# Patient Record
Sex: MALE | Race: WHITE | NOT HISPANIC OR LATINO | URBAN - METROPOLITAN AREA
[De-identification: names, ages, dates, MRNs, and addresses within clinical notes are randomized per-mention and may not be internally consistent; named-entity substitution may affect disease eponyms.]

---

## 2019-10-25 ENCOUNTER — OUTPATIENT (OUTPATIENT)
Dept: OUTPATIENT SERVICES | Facility: HOSPITAL | Age: 59
LOS: 1 days | Discharge: HOME | End: 2019-10-25

## 2019-10-25 LAB
GLUCOSE BLDC GLUCOMTR-MCNC: 144 MG/DL — HIGH (ref 70–99)
GLUCOSE BLDC GLUCOMTR-MCNC: 87 MG/DL — SIGNIFICANT CHANGE UP (ref 70–99)

## 2020-01-22 ENCOUNTER — FORM ENCOUNTER (OUTPATIENT)
Age: 60
End: 2020-01-22

## 2020-10-02 ENCOUNTER — FORM ENCOUNTER (OUTPATIENT)
Age: 60
End: 2020-10-02

## 2020-10-28 ENCOUNTER — FORM ENCOUNTER (OUTPATIENT)
Age: 60
End: 2020-10-28

## 2020-11-01 ENCOUNTER — FORM ENCOUNTER (OUTPATIENT)
Age: 60
End: 2020-11-01

## 2020-12-13 ENCOUNTER — FORM ENCOUNTER (OUTPATIENT)
Age: 60
End: 2020-12-13

## 2021-03-14 ENCOUNTER — FORM ENCOUNTER (OUTPATIENT)
Age: 61
End: 2021-03-14

## 2021-03-28 ENCOUNTER — FORM ENCOUNTER (OUTPATIENT)
Age: 61
End: 2021-03-28

## 2021-08-04 ENCOUNTER — FORM ENCOUNTER (OUTPATIENT)
Age: 61
End: 2021-08-04

## 2022-11-23 ENCOUNTER — APPOINTMENT (OUTPATIENT)
Dept: UROLOGY | Facility: CLINIC | Age: 62
End: 2022-11-23

## 2022-11-23 DIAGNOSIS — Z80.9 FAMILY HISTORY OF MALIGNANT NEOPLASM, UNSPECIFIED: ICD-10-CM

## 2022-11-23 DIAGNOSIS — E78.00 PURE HYPERCHOLESTEROLEMIA, UNSPECIFIED: ICD-10-CM

## 2022-11-23 DIAGNOSIS — E11.9 TYPE 2 DIABETES MELLITUS W/OUT COMPLICATIONS: ICD-10-CM

## 2022-11-23 DIAGNOSIS — Z48.816 ENCOUNTER FOR SURGICAL AFTERCARE FOLLOWING SURGERY ON THE GENITOURINARY SYSTEM: ICD-10-CM

## 2022-11-23 DIAGNOSIS — Z82.49 FAMILY HISTORY OF ISCHEMIC HEART DISEASE AND OTHER DISEASES OF THE CIRCULATORY SYSTEM: ICD-10-CM

## 2022-11-23 DIAGNOSIS — Z86.59 PERSONAL HISTORY OF OTHER MENTAL AND BEHAVIORAL DISORDERS: ICD-10-CM

## 2022-11-23 DIAGNOSIS — Z86.19 PERSONAL HISTORY OF OTHER INFECTIOUS AND PARASITIC DISEASES: ICD-10-CM

## 2022-11-23 PROCEDURE — 99214 OFFICE O/P EST MOD 30 MIN: CPT | Mod: 95

## 2022-11-23 NOTE — ASSESSMENT
[FreeTextEntry1] : The Rezum procedure with discussed with the patient.  He understands that depending on the size of the prostate he may need anywhere from 4 to 8 injections of steam.  He also understands that if he has more a injections he will need to have a catheter for a longer period of time.  He will need a refill for his medication and will follow-up in 3 months after the holidays to obtain an evaluation of the prostate with cystoscopy.

## 2022-11-23 NOTE — REASON FOR VISIT
[Follow-up Visit ___] : a follow-up visit  for [unfilled] [Home] : at home, [unfilled] , at the time of the visit. [Medical Office: ([facility])___] : at the medical office located in  [Patient] : the patient [Self] : self

## 2022-11-23 NOTE — HISTORY OF PRESENT ILLNESS
[FreeTextEntry1] : The patient is a 62-year-old gentleman with symptoms of bladder outlet obstruction managed with alfuzosin 10 mg and tadalafil 5 mg daily.  His symptoms have improved but he continues to complain of nocturia x3 and increased frequency of urination.  He complains that his stream is very weak as well.  He is interested in the Rezum procedure.

## 2023-02-12 NOTE — END OF VISIT
Nurse to see if iron studies can be added to current specimen  ^^^^    Unstable labs - will review with patient at upcoming appointment  Anemia - See pending labs - iron studies  Patient previously referred to GI  Low vitamin D - Recommend start multivitamin and over-the-counter vitamin D3 1000 - 3000 International Units daily  [Time Spent: ___ minutes] : I have spent [unfilled] minutes of time on the encounter.

## 2023-11-20 ENCOUNTER — APPOINTMENT (OUTPATIENT)
Dept: UROLOGY | Facility: CLINIC | Age: 63
End: 2023-11-20
Payer: MEDICARE

## 2023-11-20 DIAGNOSIS — N50.3 CYST OF EPIDIDYMIS: ICD-10-CM

## 2023-11-20 PROCEDURE — 99214 OFFICE O/P EST MOD 30 MIN: CPT | Mod: 95

## 2024-02-06 NOTE — REVIEW OF SYSTEMS
[Nocturia] : nocturia [Incomplete Emptying] : incomplete emptying of bladder [Negative] : Heme/Lymph

## 2024-02-08 ENCOUNTER — APPOINTMENT (OUTPATIENT)
Dept: UROLOGY | Facility: CLINIC | Age: 64
End: 2024-02-08
Payer: MEDICARE

## 2024-02-08 VITALS
BODY MASS INDEX: 23.1 KG/M2 | HEIGHT: 71 IN | TEMPERATURE: 98.1 F | DIASTOLIC BLOOD PRESSURE: 78 MMHG | SYSTOLIC BLOOD PRESSURE: 130 MMHG | WEIGHT: 165 LBS

## 2024-02-08 PROCEDURE — 51798 US URINE CAPACITY MEASURE: CPT | Mod: 59

## 2024-02-08 PROCEDURE — 51725 SIMPLE CYSTOMETROGRAM: CPT

## 2024-02-08 PROCEDURE — 51741 ELECTRO-UROFLOWMETRY FIRST: CPT

## 2024-02-08 PROCEDURE — 99214 OFFICE O/P EST MOD 30 MIN: CPT | Mod: 25

## 2024-02-08 PROCEDURE — 52000 CYSTOURETHROSCOPY: CPT

## 2024-02-08 NOTE — HISTORY OF PRESENT ILLNESS
[FreeTextEntry1] : 63M BPH/LUTS on tadalafil and Flomax continues to have symptoms interested in Rezum s/p IPP without issue  PSA 1.1 (01/24/24)  %Free 18

## 2024-02-08 NOTE — PHYSICAL EXAM
[General Appearance - Well Developed] : well developed [General Appearance - Well Nourished] : well nourished [Heart Rate And Rhythm] : heart rate and rhythm were normal [] : no respiratory distress [Bowel Sounds] : normal bowel sounds [Respiration, Rhythm And Depth] : normal respiratory rhythm and effort [Abdomen Soft] : soft [Urethral Meatus] : meatus normal [Penis Abnormality] : normal circumcised penis [Prostate Enlargement] : the prostate was not enlarged [Prostate Size ___ gm] : prostate size [unfilled] gm [de-identified] : The patient is status post insertion of inflatable multicomponent inflatable penile prosthesis in the past.  The overall appearance is excellent there is no edema, swelling or erythema. The incision is well-healed the edges are well approximated there is no discharge.  Cylinders sizing is excellent.  The distal tips are well positioned into the mid glans.  The appearance of the cylinders deflated and the shaft of the penis flaccid is also good.  Scrotal skin is intact the location of the pump is excellent it is in in in the back of the scrotum, well concealed, yet accessible to manipulation.  There is no tethering of the pump.  The reservoir in the lower quadrant of the abdomen is nonpalpable.  KEREN: small palpable prostate, no nodules

## 2024-02-08 NOTE — END OF VISIT
[Time Spent: ___ minutes] : I have spent [unfilled] minutes of time on the encounter. [FreeTextEntry3] :  The complete time calculation (35 minutes) for this patient encounter, includes a review of the patient's past medical records pertinent to his chief complaint, including medical, and surgical history, review of medications taken and of previous visits with other health care providers. In addition to the time spent with the patient, the total time calculation also includes the time necessary to document all of the formation gathered during this session into the patient's electronic health records.

## 2024-02-08 NOTE — PLAN
[TextEntry] : A/P: 63M w/ ED and BPH/LUTS - implant working well - c/w Flomax/Tadalafil - cystoscopy with small prostate, enlarged lateral lobes - interested in [facility]

## 2024-02-08 NOTE — ASSESSMENT
[FreeTextEntry1] : OBDULIA TELLEZ  [birth year]  Procedure: Cystourethroscopy  Location: [facility] at [address] [place] [address]  Surgeon: SOLITARIO Muñoz M.D.  Preop Diagnosis: Pre-operative evaluation, urinary frequency,BPH,obstruction/Urinary retention  Postop Diagnosis: BPH with bladder outlet obstruction and urinary retention Anesthesia: 2% Intraurethral Lidocaine Jelly  Medication: Ciprofloxacin 500 mg  Complication: None  I discussed with the patient, the risks and benefits of cystourethroscopy which includes hematuria, dysuria, stricture formation. The patient agrees to proceed with the above procedure:  Procedure description: The patient was placed in the dorsal lithotomy position, prepped and draped in the usual standard sterile fashion with surgical Betadine soap and wash. 2% lidocaine jelly was inserted intraurethrally via the meatus, and a clamp was applied to the penis and lidocaine jelly was allowed to remain in place for 5 minutes. The meatus was then intubated with a #16 Palauan flexible disposable cystoscope. Visual cystourethroscopic examination was initiated under sterile water irrigation.  The following findings were noted:  The anterior urethra was normal.  The Bulbar urethra was also normal.  The membranous urethra was normal without any strictures.  The prostatic urethra, lateral lobe and verumontanum appeared consistent with BPH.  The prostatic urethra was mildly obstructed with bilobar hypertrophy and an elevated bladder neck.  Ureteral orifices were identified and clear efflux of urine was observed bilaterally.  The bladder was examined in its entirety in a systematic fashion. Trabeculation observed: Moderate No mucosal abnormalities, stone, tumors, foreign bodies or diverticula identified. On retroflex, there was no significant prostatic tissue protruding into the bladder.  The patient tolerated the procedure well and he was discharged to home in stable condition.  CYSTOMETROGRAM: Preop Diagnosis: Increased Frequency of urination.  Postop Diagnosis: Increased Frequency of urination. Anesthesia: 2 % Intraurethral Lidocaine Jelly  Medication: Ciprofloxacin Complications: None  Procedure Note:  The findings observed are described in the cystoscopy report. With the flexible cystocope indwelling into the bladder, a sterile line of intravenous saline was connected into a manometer. This allowed us to measure the amount of fluid instilled and intravesical pressure.   The following findings of the cystometrogram were noted.  Bladder wall compliance: Normal Functional bladder capacity:  429   cc The patient perceived a first sensation that the bladder was filling with saline at:  200   cc His first urge to void was observed at  225  cc of saline.  His post void residual was measured at  58   cc Impression: Voiding proprioception: normal  Detrusor instability: not present  Summary: Normal study and no contraindication to proceeding with the surgery. The patient tolerated the procedure well.  BLADDER SCAN: Indication: Increased frequency of urination. Initial Volume: 429  cc PVR:  58 cc Results: Urinary retention Recommendations: No Therapy Needed.  URO FLOWMETRY: Indication: Increased frequency of urination. Urinary Flow Rate: 18.7   m/s Results: Urinary retention Recommendations: No therapy needed.

## 2024-02-20 RX ORDER — TADALAFIL 5 MG/1
5 TABLET ORAL
Qty: 90 | Refills: 1 | Status: ACTIVE | COMMUNITY
Start: 2023-03-29 | End: 1900-01-01

## 2024-04-01 RX ORDER — ALFUZOSIN HYDROCHLORIDE 10 MG/1
10 TABLET, EXTENDED RELEASE ORAL DAILY
Qty: 90 | Refills: 1 | Status: ACTIVE | COMMUNITY
Start: 2023-03-29 | End: 1900-01-01

## 2024-05-09 ENCOUNTER — APPOINTMENT (OUTPATIENT)
Dept: UROLOGY | Facility: CLINIC | Age: 64
End: 2024-05-09
Payer: MEDICARE

## 2024-05-09 VITALS
TEMPERATURE: 97.5 F | DIASTOLIC BLOOD PRESSURE: 64 MMHG | BODY MASS INDEX: 23.54 KG/M2 | WEIGHT: 168.13 LBS | HEIGHT: 71 IN | SYSTOLIC BLOOD PRESSURE: 120 MMHG

## 2024-05-09 DIAGNOSIS — Z01.818 ENCOUNTER FOR OTHER PREPROCEDURAL EXAMINATION: ICD-10-CM

## 2024-05-09 DIAGNOSIS — N52.9 MALE ERECTILE DYSFUNCTION, UNSPECIFIED: ICD-10-CM

## 2024-05-09 DIAGNOSIS — Z79.01 LONG TERM (CURRENT) USE OF ANTICOAGULANTS: ICD-10-CM

## 2024-05-09 PROCEDURE — 99215 OFFICE O/P EST HI 40 MIN: CPT | Mod: 25

## 2024-05-09 PROCEDURE — 51798 US URINE CAPACITY MEASURE: CPT

## 2024-05-09 PROCEDURE — 51741 ELECTRO-UROFLOWMETRY FIRST: CPT

## 2024-05-09 RX ORDER — HYDROXYZINE HYDROCHLORIDE 50 MG/1
50 TABLET ORAL
Refills: 0 | Status: ACTIVE | COMMUNITY

## 2024-05-09 RX ORDER — PREDNISONE 5 MG/1
5 TABLET ORAL
Refills: 0 | Status: ACTIVE | COMMUNITY

## 2024-05-09 RX ORDER — CARVEDILOL 6.25 MG/1
6.25 TABLET, FILM COATED ORAL
Refills: 0 | Status: ACTIVE | COMMUNITY

## 2024-05-09 RX ORDER — DEXLANSOPRAZOLE 60 MG/1
60 CAPSULE, DELAYED RELEASE ORAL
Refills: 0 | Status: ACTIVE | COMMUNITY

## 2024-05-09 RX ORDER — BIFIDOBACTERIUM LONGUM 10MM CELL
4 CAPSULE ORAL
Refills: 0 | Status: ACTIVE | COMMUNITY

## 2024-05-09 RX ORDER — INSULIN GLARGINE 100 [IU]/ML
100 INJECTION, SOLUTION SUBCUTANEOUS
Refills: 0 | Status: ACTIVE | COMMUNITY

## 2024-05-09 RX ORDER — RIVAROXABAN 10 MG/1
10 TABLET, FILM COATED ORAL
Refills: 0 | Status: ACTIVE | COMMUNITY

## 2024-05-09 RX ORDER — VALACYCLOVIR 500 MG/1
TABLET, FILM COATED ORAL
Refills: 0 | Status: ACTIVE | COMMUNITY

## 2024-05-09 RX ORDER — DICYCLOMINE HYDROCHLORIDE 20 MG/1
20 TABLET ORAL
Refills: 0 | Status: ACTIVE | COMMUNITY

## 2024-05-09 RX ORDER — INSULIN LISPRO 100 [IU]/ML
100 INJECTION, SOLUTION INTRAVENOUS; SUBCUTANEOUS
Refills: 0 | Status: ACTIVE | COMMUNITY

## 2024-05-09 RX ORDER — TESTOSTERONE 200 MG
200 PELLET (EA) IMPLANTATION
Refills: 0 | Status: ACTIVE | COMMUNITY

## 2024-05-09 RX ORDER — SUCRALFATE 1 G/1
TABLET ORAL
Refills: 0 | Status: ACTIVE | COMMUNITY

## 2024-05-09 RX ORDER — GLIMEPIRIDE 4 MG/1
4 TABLET ORAL
Refills: 0 | Status: ACTIVE | COMMUNITY

## 2024-05-09 RX ORDER — AMLODIPINE BESYLATE 10 MG/1
10 TABLET ORAL
Refills: 0 | Status: ACTIVE | COMMUNITY

## 2024-05-09 RX ORDER — FENOFIBRATE 145 MG/1
145 TABLET, COATED ORAL
Refills: 0 | Status: ACTIVE | COMMUNITY

## 2024-05-09 RX ORDER — MECOBAL/LEVOMEFOLAT CA/B6 PHOS 2-3-35 MG
3-35-2 TABLET ORAL
Refills: 0 | Status: ACTIVE | COMMUNITY

## 2024-05-09 RX ORDER — HYDROXYCHLOROQUINE SULFATE 200 MG/1
200 TABLET, FILM COATED ORAL
Refills: 0 | Status: ACTIVE | COMMUNITY

## 2024-05-09 RX ORDER — NIACINAMIDE 500 MG
500 TABLET, EXTENDED RELEASE ORAL
Refills: 0 | Status: ACTIVE | COMMUNITY

## 2024-05-09 RX ORDER — METHYLPREDNISOLONE SODIUM SUCCINATE 1 G/8ML
INJECTION, POWDER, FOR SOLUTION INTRAMUSCULAR; INTRAVENOUS
Refills: 0 | Status: ACTIVE | COMMUNITY

## 2024-05-09 RX ORDER — ICOSAPENT ETHYL 500 MG/1
CAPSULE ORAL
Refills: 0 | Status: ACTIVE | COMMUNITY

## 2024-05-09 RX ORDER — RAMIPRIL 10 MG/1
10 CAPSULE ORAL
Refills: 0 | Status: ACTIVE | COMMUNITY

## 2024-05-09 RX ORDER — SERTRALINE HYDROCHLORIDE 100 MG/1
100 TABLET, FILM COATED ORAL
Refills: 0 | Status: ACTIVE | COMMUNITY

## 2024-05-09 RX ORDER — ATORVASTATIN CALCIUM 40 MG/1
40 TABLET, FILM COATED ORAL
Refills: 0 | Status: ACTIVE | COMMUNITY

## 2024-05-09 RX ORDER — ERYTHROMYCIN BASE 250 MG
250 CAPSULE,DELAYED RELEASE (ENTERIC COATED) ORAL
Refills: 0 | Status: ACTIVE | COMMUNITY

## 2024-05-09 NOTE — PHYSICAL EXAM
[Normal Appearance] : normal appearance [Well Groomed] : well groomed [General Appearance - In No Acute Distress] : no acute distress [Edema] : no peripheral edema [Respiration, Rhythm And Depth] : normal respiratory rhythm and effort [Exaggerated Use Of Accessory Muscles For Inspiration] : no accessory muscle use [Abdomen Soft] : soft [Abdomen Tenderness] : non-tender [Costovertebral Angle Tenderness] : no ~M costovertebral angle tenderness [Urinary Bladder Findings] : the bladder was normal on palpation [Normal Station and Gait] : the gait and station were normal for the patient's age [] : no rash [No Focal Deficits] : no focal deficits [Oriented To Time, Place, And Person] : oriented to person, place, and time [Affect] : the affect was normal [Mood] : the mood was normal [No Palpable Adenopathy] : no palpable adenopathy [de-identified] : The penis is circumcised. Severe fibrosis and atrophy of the cavernosal bodies is noted on palpation. The length of the penis is fixed and inelastic. The stretched penile length is diminished. The scrotum and testicles are normal. The skin of the penile shaft and scrotum is clean and intact.

## 2024-05-09 NOTE — HISTORY OF PRESENT ILLNESS
[FreeTextEntry1] : Patient is here for his preoperative evaluation.  Patient has severe erectile dysfunction secondary to type 2 diabetes.  He remains unresponsive to oral medication.  He is not interested in using penile injection therapy at this point.

## 2024-05-09 NOTE — ASSESSMENT
[FreeTextEntry1] :  BLADDER SCAN: Indication: Increased frequency of urination. Initial Volume: 273   cc PVR: 75  cc Results: Urinary retention Recommendations: No Therapy Needed.  URO FLOWMETRY: Indication: Increased frequency of urination. Urinary Flow Rate: 5.2   m/s Results: Urinary retention Recommendations: No therapy needed.

## 2024-05-14 VITALS
SYSTOLIC BLOOD PRESSURE: 94 MMHG | OXYGEN SATURATION: 96 % | RESPIRATION RATE: 16 BRPM | DIASTOLIC BLOOD PRESSURE: 58 MMHG | WEIGHT: 161.38 LBS | TEMPERATURE: 96 F | HEART RATE: 68 BPM | HEIGHT: 71 IN

## 2024-05-14 RX ORDER — CEPHALEXIN 250 MG/1
250 CAPSULE ORAL
Qty: 84 | Refills: 0 | Status: ACTIVE | COMMUNITY
Start: 2024-05-14 | End: 1900-01-01

## 2024-05-14 RX ORDER — GENTAMICIN SULFATE 40 MG/ML
240 VIAL (ML) INJECTION ONCE
Refills: 0 | Status: DISCONTINUED | OUTPATIENT
Start: 2024-05-15 | End: 2024-05-15

## 2024-05-14 RX ORDER — TRAMADOL HYDROCHLORIDE 50 MG/1
50 TABLET, COATED ORAL
Qty: 14 | Refills: 0 | Status: ACTIVE | COMMUNITY
Start: 2024-05-14 | End: 1900-01-01

## 2024-05-14 NOTE — ASU PATIENT PROFILE, ADULT - NSICDXPASTMEDICALHX_GEN_ALL_CORE_FT
PAST MEDICAL HISTORY:  CAD (coronary artery disease)     CML (chronic myeloid leukemia)     DM (diabetes mellitus)     Dyslipidemia     History of BPH     HTN (hypertension)      PAST MEDICAL HISTORY:  CAD (coronary artery disease) cardiac stent    CML (chronic myeloid leukemia)     DM (diabetes mellitus)     Dyslipidemia     GERD (gastroesophageal reflux disease)     History of BPH     HTN (hypertension)     Polymyositis

## 2024-05-14 NOTE — ASU PATIENT PROFILE, ADULT - NS TRANSFER PATIENT BELONGINGS
Const: Denies fever, chills  HEENT: Denies blurry vision, sore throat  Neck: Denies neck pain/stiffness  Resp: Denies coughing, SOB  Cardiovascular: Denies CP, palpitations, LE edema  GI:  + Nausea, + vomiting, + right upper quadrant abdominal pain. Denies diarrhea, constipation, blood in stool  : Denies urinary frequency/urgency/dysuria, hematuria  MSK: Denies back pain  Neuro: Denies HA, dizziness, numbness, weakness  Skin: Denies rashes.
Clothing

## 2024-05-14 NOTE — ASU PATIENT PROFILE, ADULT - NSICDXPASTSURGICALHX_GEN_ALL_CORE_FT
PAST SURGICAL HISTORY:  H/O bone marrow transplant     H/O sinus surgery     History of hernia surgery

## 2024-05-15 ENCOUNTER — OUTPATIENT (OUTPATIENT)
Dept: INPATIENT UNIT | Facility: HOSPITAL | Age: 64
LOS: 1 days | Discharge: HOME | End: 2024-05-15
Payer: MEDICARE

## 2024-05-15 ENCOUNTER — TRANSCRIPTION ENCOUNTER (OUTPATIENT)
Age: 64
End: 2024-05-15

## 2024-05-15 ENCOUNTER — APPOINTMENT (OUTPATIENT)
Dept: UROLOGY | Facility: HOSPITAL | Age: 64
End: 2024-05-15
Payer: MEDICARE

## 2024-05-15 VITALS — HEART RATE: 66 BPM | RESPIRATION RATE: 18 BRPM | OXYGEN SATURATION: 95 %

## 2024-05-15 DIAGNOSIS — Z98.890 OTHER SPECIFIED POSTPROCEDURAL STATES: Chronic | ICD-10-CM

## 2024-05-15 DIAGNOSIS — Z94.81 BONE MARROW TRANSPLANT STATUS: Chronic | ICD-10-CM

## 2024-05-15 LAB
GLUCOSE BLDC GLUCOMTR-MCNC: 166 MG/DL — HIGH (ref 70–99)
GLUCOSE BLDC GLUCOMTR-MCNC: 192 MG/DL — HIGH (ref 70–99)

## 2024-05-15 PROCEDURE — 53854 TRURL DSTRJ PRST8 TISS RF WV: CPT

## 2024-05-15 PROCEDURE — 82962 GLUCOSE BLOOD TEST: CPT

## 2024-05-15 PROCEDURE — 53854 TRURL DSTRJ PRST8 TISS RF WV: CPT | Mod: GC,22

## 2024-05-15 RX ORDER — DEXLANSOPRAZOLE 30 MG/1
1 CAPSULE, DELAYED RELEASE ORAL
Refills: 0 | DISCHARGE

## 2024-05-15 RX ORDER — ATORVASTATIN CALCIUM 80 MG/1
1 TABLET, FILM COATED ORAL
Refills: 0 | DISCHARGE

## 2024-05-15 RX ORDER — HYDROXYCHLOROQUINE SULFATE 200 MG
1 TABLET ORAL
Refills: 0 | DISCHARGE

## 2024-05-15 RX ORDER — ALFUZOSIN HYDROCHLORIDE 10 MG/1
1 TABLET, EXTENDED RELEASE ORAL
Refills: 0 | DISCHARGE

## 2024-05-15 RX ORDER — INSULIN LISPRO 100/ML
0 VIAL (ML) SUBCUTANEOUS
Refills: 0 | DISCHARGE

## 2024-05-15 RX ORDER — GLIMEPIRIDE 1 MG
1 TABLET ORAL
Refills: 0 | DISCHARGE

## 2024-05-15 RX ORDER — ICOSAPENT ETHYL 500 MG/1
2 CAPSULE, LIQUID FILLED ORAL
Refills: 0 | DISCHARGE

## 2024-05-15 RX ORDER — INSULIN GLARGINE 100 [IU]/ML
40 INJECTION, SOLUTION SUBCUTANEOUS
Refills: 0 | DISCHARGE

## 2024-05-15 RX ORDER — CARVEDILOL PHOSPHATE 80 MG/1
1 CAPSULE, EXTENDED RELEASE ORAL
Refills: 0 | DISCHARGE

## 2024-05-15 RX ORDER — ASPIRIN/CALCIUM CARB/MAGNESIUM 324 MG
0 TABLET ORAL
Refills: 0 | DISCHARGE

## 2024-05-15 RX ORDER — FENOFIBRATE,MICRONIZED 130 MG
1 CAPSULE ORAL
Refills: 0 | DISCHARGE

## 2024-05-15 RX ORDER — VANCOMYCIN HCL 1 G
1250 VIAL (EA) INTRAVENOUS ONCE
Refills: 0 | Status: COMPLETED | OUTPATIENT
Start: 2024-05-15 | End: 2024-05-15

## 2024-05-15 RX ORDER — HYDROXYZINE HCL 10 MG
1 TABLET ORAL
Refills: 0 | DISCHARGE

## 2024-05-15 RX ORDER — SERTRALINE 25 MG/1
1 TABLET, FILM COATED ORAL
Refills: 0 | DISCHARGE

## 2024-05-15 RX ORDER — RIVAROXABAN 15 MG-20MG
0 KIT ORAL
Refills: 0 | DISCHARGE

## 2024-05-15 RX ORDER — LIDOCAINE HCL 20 MG/ML
2 VIAL (ML) INJECTION
Refills: 0 | Status: DISCONTINUED | OUTPATIENT
Start: 2024-05-15 | End: 2024-05-15

## 2024-05-15 RX ORDER — RAMIPRIL 5 MG
1 CAPSULE ORAL
Refills: 0 | DISCHARGE

## 2024-05-15 RX ADMIN — Medication 187.5 MILLIGRAM(S): at 06:34

## 2024-05-15 RX ADMIN — Medication 2 MILLILITER(S): at 10:27

## 2024-05-15 NOTE — PRE-ANESTHESIA EVALUATION ADULT - NSANTHOSAYNRD_GEN_A_CORE
No. BEVERLY screening performed.  STOP BANG Legend: 0-2 = LOW Risk; 3-4 = INTERMEDIATE Risk; 5-8 = HIGH Risk

## 2024-05-15 NOTE — ASU DISCHARGE PLAN (ADULT/PEDIATRIC) - ASU DC SPECIAL INSTRUCTIONSFT
TOM    GENERAL: It is common to have blood in your urine after your procedure.  It may be pink or even red; and it is important to increase fluid intake to 2-3L of water per day to keep the urine as clear as possible. Please inform your doctor if you have a significant amount of clot in the urine or if you are unable to void at all.  The urine may clear and then become bloody again especially as you are more physically active. It is not uncommon to have some burning when you urinate, this will gradually improve. With a catheter in place, it is not uncommon to have occasional leakage or urine or blood around the catheter. Please call your urologist if this is excessive and/or the urine is not draining through the catheter into the bag.    CATHETER: Most patients are sent home with a Mancilla catheter, which continuously drains the urine from the bladder. If you still have a catheter, the nurses will review instructions and care before you go home. Use your 10cc syringe to remove your catheter in 3 days on 5/18/2024.    PAIN: You may take Tylenol (acetaminophen) 650-975mg and/or Motrin (ibuprofen) 400-600mg, both available over the counter, for pain every 6 hours as needed. Do not exceed 4000mg of Tylenol (acetaminophen) daily. You may alternate these medications such that you take one or the other every 3 hours for around the clock pain coverage.    ANTIBIOTICS: You may be given a prescription for an antibiotic, please take this medication as instructed and be sure to complete the entire course. Please continue the antibiotic that was preoperatively prescribed.    STOOL SOFTENERS: Do not allow yourself to become constipated as straining may cause bleeding. Take stool softeners or a laxative (ex. Miralax, Colace, Senokot, ExLax, etc), available over the counter, if needed.    ANTICOAGULATION: If you are taking any blood thinning medications, please discuss with your urologist prior to restarting these medications unless otherwise specified. Please restart your home Xarelto on 5/18/2024.    BATHING: You may shower or bathe. If going home with mancilla, shower only until catheter is removed.    DIET: You may resume your regular diet and regular medication regimen.    ACTIVITY: No heavy lifting or strenuous exercise until you are evaluated at your post-operative appointment. Otherwise, you may return to your usual level of physical activity.    FOLLOW-UP: If you did not already schedule your post-operative appointment, please call your urologist to schedule and follow-up appointment. Follow up with Dr. Muñoz in 1-2 weeks.    CALL YOUR UROLOGIST IF: You have any bleeding that does not stop, inability to void >8 hours, fever over 100.4 F, chills, persistent nausea/vomiting, changes in your incision concerning for infection, or if your pain is not controlled on your discharge pain medications.

## 2024-05-15 NOTE — ASU DISCHARGE PLAN (ADULT/PEDIATRIC) - CARE PROVIDER_API CALL
Toni, Moreno  Urology  [address] [place] [id number]  Phone: [phone]  Fax: [phone]  Follow Up Time:

## 2024-05-15 NOTE — ASU DISCHARGE PLAN (ADULT/PEDIATRIC) - NS MD DC FALL RISK RISK
For information on Fall & Injury Prevention, visit: [email or web address] OR  [email or web address] OR  https://www.cdc.gov/steadi/patient.html

## 2024-05-15 NOTE — BRIEF OPERATIVE NOTE - NSICDXBRIEFPREOP_GEN_ALL_CORE_FT
PRE-OP DIAGNOSIS:  Enlarged prostate with lower urinary tract symptoms (LUTS) 15-May-2024 08:26:37  Jordi Mccord

## 2024-05-15 NOTE — BRIEF OPERATIVE NOTE - NSICDXBRIEFPOSTOP_GEN_ALL_CORE_FT
POST-OP DIAGNOSIS:  Enlarged prostate with lower urinary tract symptoms (LUTS) 15-May-2024 08:26:43  Jordi Mccord

## 2024-05-17 ENCOUNTER — NON-APPOINTMENT (OUTPATIENT)
Age: 64
End: 2024-05-17

## 2024-05-17 PROBLEM — M33.20 POLYMYOSITIS, ORGAN INVOLVEMENT UNSPECIFIED: Chronic | Status: ACTIVE | Noted: 2024-05-14

## 2024-05-17 PROBLEM — C92.10 CHRONIC MYELOID LEUKEMIA, BCR/ABL-POSITIVE, NOT HAVING ACHIEVED REMISSION: Chronic | Status: ACTIVE | Noted: 2024-05-14

## 2024-05-17 PROBLEM — Z87.438 PERSONAL HISTORY OF OTHER DISEASES OF MALE GENITAL ORGANS: Chronic | Status: ACTIVE | Noted: 2024-05-14

## 2024-05-17 PROBLEM — E11.9 TYPE 2 DIABETES MELLITUS WITHOUT COMPLICATIONS: Chronic | Status: ACTIVE | Noted: 2024-05-14

## 2024-05-17 PROBLEM — I25.10 ATHEROSCLEROTIC HEART DISEASE OF NATIVE CORONARY ARTERY WITHOUT ANGINA PECTORIS: Chronic | Status: ACTIVE | Noted: 2024-05-14

## 2024-05-17 PROBLEM — I10 ESSENTIAL (PRIMARY) HYPERTENSION: Chronic | Status: ACTIVE | Noted: 2024-05-14

## 2024-05-17 PROBLEM — E78.5 HYPERLIPIDEMIA, UNSPECIFIED: Chronic | Status: ACTIVE | Noted: 2024-05-14

## 2024-05-17 PROBLEM — K21.9 GASTRO-ESOPHAGEAL REFLUX DISEASE WITHOUT ESOPHAGITIS: Chronic | Status: ACTIVE | Noted: 2024-05-14

## 2024-05-30 ENCOUNTER — APPOINTMENT (OUTPATIENT)
Dept: UROLOGY | Facility: CLINIC | Age: 64
End: 2024-05-30
Payer: MEDICARE

## 2024-05-30 VITALS
DIASTOLIC BLOOD PRESSURE: 70 MMHG | SYSTOLIC BLOOD PRESSURE: 120 MMHG | BODY MASS INDEX: 23.52 KG/M2 | WEIGHT: 168 LBS | HEIGHT: 71 IN | TEMPERATURE: 97.6 F

## 2024-05-30 DIAGNOSIS — N40.1 BENIGN PROSTATIC HYPERPLASIA WITH LOWER URINARY TRACT SYMPMS: ICD-10-CM

## 2024-05-30 DIAGNOSIS — E11.59 TYPE 2 DIABETES MELLITUS WITH OTHER CIRCULATORY COMPLICATIONS: ICD-10-CM

## 2024-05-30 DIAGNOSIS — R35.1 BENIGN PROSTATIC HYPERPLASIA WITH LOWER URINARY TRACT SYMPMS: ICD-10-CM

## 2024-05-30 DIAGNOSIS — R39.12 POOR URINARY STREAM: ICD-10-CM

## 2024-05-30 DIAGNOSIS — R35.0 FREQUENCY OF MICTURITION: ICD-10-CM

## 2024-05-30 DIAGNOSIS — N52.1 TYPE 2 DIABETES MELLITUS WITH OTHER CIRCULATORY COMPLICATIONS: ICD-10-CM

## 2024-05-30 DIAGNOSIS — Z00.00 ENCOUNTER FOR GENERAL ADULT MEDICAL EXAMINATION W/OUT ABNORMAL FINDINGS: ICD-10-CM

## 2024-05-30 DIAGNOSIS — R35.0 BENIGN PROSTATIC HYPERPLASIA WITH LOWER URINARY TRACT SYMPMS: ICD-10-CM

## 2024-05-30 DIAGNOSIS — Z96.0 PRESENCE OF UROGENITAL IMPLANTS: ICD-10-CM

## 2024-05-30 PROCEDURE — 99213 OFFICE O/P EST LOW 20 MIN: CPT | Mod: 25

## 2024-05-30 PROCEDURE — 51741 ELECTRO-UROFLOWMETRY FIRST: CPT

## 2024-05-30 PROCEDURE — 51798 US URINE CAPACITY MEASURE: CPT

## 2024-05-31 RX ORDER — TADALAFIL 5 MG/1
5 TABLET ORAL
Qty: 90 | Refills: 3 | Status: ACTIVE | COMMUNITY
Start: 2024-05-31 | End: 1900-01-01

## 2024-06-03 PROBLEM — R39.12 WEAK URINARY STREAM: Status: ACTIVE | Noted: 2022-11-23

## 2024-06-03 PROBLEM — E11.59 TYPE 2 DIABETES WITH CIRCULATORY DISORDER CAUSING ERECTILE DYSFUNCTION: Status: ACTIVE | Noted: 2022-11-22

## 2024-06-03 PROBLEM — N40.1 BENIGN PROSTATIC HYPERPLASIA WITH URINARY FREQUENCY: Status: ACTIVE | Noted: 2022-11-23

## 2024-06-03 PROBLEM — N40.1 NOCTURIA ASSOCIATED WITH BENIGN PROSTATIC HYPERPLASIA: Status: ACTIVE | Noted: 2022-11-23

## 2024-06-03 PROBLEM — Z00.00 ENCOUNTER FOR PREVENTIVE HEALTH EXAMINATION: Status: ACTIVE | Noted: 2022-10-07

## 2024-06-03 PROBLEM — Z96.0 S/P INSERTION OF PENILE IMPLANT: Status: ACTIVE | Noted: 2022-11-22

## 2024-06-03 PROBLEM — R35.0 FREQUENCY OF URINATION: Status: ACTIVE | Noted: 2022-11-22

## 2024-06-03 NOTE — HISTORY OF PRESENT ILLNESS
[FreeTextEntry1] : 63M, s/p Rezum 5/15/2024 S/P Insertion of IPP on 10/25/2019 pt feels fine, no pain or bleeding, but reports episodic strong urge to urinate. Bladder scan today revealed similar PVR as pre-Rezum But Flow rate has doubled which is likely contributing to feeling of urgency Seeing endo for DM management, last A1c 7.4. Working on getting it down. Pt aware that DM can also contribute to urgency.

## 2024-06-03 NOTE — PLAN
[TextEntry] : A/P, 63M s/p Joseluis 5/15/2024 - Pt will create account w/ [facility] and then let us know so we can send Rx for tadalafil. - f/u in 2-month to repeat bladder scan - Pt to f/u with endo for DM management, and explore new continuous glucose monitoring system

## 2024-06-03 NOTE — END OF VISIT
[FreeTextEntry3] : The complete time calculation (   25 minutes) for this patient encounter, includes a review of the patient's past medical records pertinent to his chief complaint, including medical, and surgical history, review of medications taken and of previous visits with other health care providers. In addition to the time spent with the patient, the total time calculation also includes the time necessary to document all of the formation gathered during this  session into the patient's electronic health records. [Time Spent: ___ minutes] : I have spent [unfilled] minutes of time on the encounter.

## 2024-06-03 NOTE — PHYSICAL EXAM
[General Appearance - Well Developed] : well developed [General Appearance - Well Nourished] : well nourished [Heart Rate And Rhythm] : heart rate and rhythm were normal [] : no respiratory distress [Respiration, Rhythm And Depth] : normal respiratory rhythm and effort [Bowel Sounds] : normal bowel sounds [Abdomen Soft] : soft [TextEntry] : The patient is status post insertion of inflatable multicomponent inflatable penile prosthesis in the past.  The overall appearance is excellent there is no edema, swelling or erythema. The incision is well-healed the edges are well approximated there is no discharge.  Cylinders sizing is excellent.  The distal tips are well positioned into the mid glans.  The appearance of the cylinders deflated and the shaft of the penis flaccid is also good.  Scrotal skin is intact the location of the pump is excellent it is in in in the back of the scrotum, well concealed, yet accessible to manipulation.  There is no tethering of the pump.  The reservoir in the lower quadrant of the abdomen is nonpalpable.

## 2024-06-03 NOTE — ASSESSMENT
[FreeTextEntry1] : BLADDER SCAN: Indication: Increased frequency of urination. Initial Volume:  451 cc PVR: 86  cc Results: Urinary retention Recommendations: No Therapy Needed.  URO FLOWMETRY: Indication: Increased frequency of urination. Urinary Flow Rate:   11.9 m/s Results: Urinary retention Recommendations: No therapy needed.

## 2024-08-05 ENCOUNTER — APPOINTMENT (OUTPATIENT)
Dept: UROLOGY | Facility: CLINIC | Age: 64
End: 2024-08-05

## 2024-08-05 PROCEDURE — 51798 US URINE CAPACITY MEASURE: CPT

## 2024-08-05 PROCEDURE — 51741 ELECTRO-UROFLOWMETRY FIRST: CPT

## 2024-08-05 PROCEDURE — 99214 OFFICE O/P EST MOD 30 MIN: CPT | Mod: 25

## 2024-08-06 NOTE — HISTORY OF PRESENT ILLNESS
[FreeTextEntry1] : 64M w/ ED and LUTS for 2 mo f/u to repeat bladder scan s/p Rezum on 5/15/24 S/P Insertion of IPP on 10/25/2019 AMS LGX/MS Pump.  Pt reports urinary urgency and wants to stop alfuzosin recent PSA, July 2024, 1.0  at 1mo post-op from Rezu, pt's bladder scan revealed similar PVR as pre-Rezum But Flow rate had doubled which is likely contributing to feeling of urgency Pt here today to repeat scan. [TextEntry] : 64M w/ ED and LUTS for 2 mo f/u to repeat bladder scan s/p Rezum on 5/15/24 S/P Insertion of IPP on 10/25/2019 AMS LGX/MS Pump.  Pt reports urinary urgency and wants to stop alfuzosin recent PSA, July 2024, 1.0  at 1mo post-op from Rezum, pt's bladder scan revealed similar PVR as pre-Rezum But Flow rate had doubled which is likely contributing to feeling of urgency Pt here today to repeat scan.

## 2024-08-06 NOTE — ASSESSMENT
[FreeTextEntry1] : BLADDER SCAN: Indication: Increased frequency of urination. Initial Volume:  233  cc PVR: 73 cc Results: Urinary retention Recommendations: No Therapy Needed.  URO FLOWMETRY: Indication: Increased frequency of urination. Urinary Flow Rate: 6.4   m/s Results: Urinary retention Recommendations: No therapy needed.

## 2025-02-03 ENCOUNTER — APPOINTMENT (OUTPATIENT)
Dept: UROLOGY | Facility: CLINIC | Age: 65
End: 2025-02-03
Payer: MEDICARE

## 2025-02-03 ENCOUNTER — APPOINTMENT (OUTPATIENT)
Dept: UROLOGY | Facility: CLINIC | Age: 65
End: 2025-02-03

## 2025-02-03 DIAGNOSIS — Z00.00 ENCOUNTER FOR GENERAL ADULT MEDICAL EXAMINATION W/OUT ABNORMAL FINDINGS: ICD-10-CM

## 2025-02-03 DIAGNOSIS — N40.1 BENIGN PROSTATIC HYPERPLASIA WITH LOWER URINARY TRACT SYMPMS: ICD-10-CM

## 2025-02-03 DIAGNOSIS — R35.1 BENIGN PROSTATIC HYPERPLASIA WITH LOWER URINARY TRACT SYMPMS: ICD-10-CM

## 2025-02-03 DIAGNOSIS — E78.00 PURE HYPERCHOLESTEROLEMIA, UNSPECIFIED: ICD-10-CM

## 2025-02-03 DIAGNOSIS — Z96.0 PRESENCE OF UROGENITAL IMPLANTS: ICD-10-CM

## 2025-02-03 DIAGNOSIS — N52.9 MALE ERECTILE DYSFUNCTION, UNSPECIFIED: ICD-10-CM

## 2025-02-03 DIAGNOSIS — E11.59 TYPE 2 DIABETES MELLITUS WITH OTHER CIRCULATORY COMPLICATIONS: ICD-10-CM

## 2025-02-03 DIAGNOSIS — R35.0 FREQUENCY OF MICTURITION: ICD-10-CM

## 2025-02-03 DIAGNOSIS — Z76.0 ENCOUNTER FOR ISSUE OF REPEAT PRESCRIPTION: ICD-10-CM

## 2025-02-03 DIAGNOSIS — R39.12 POOR URINARY STREAM: ICD-10-CM

## 2025-02-03 DIAGNOSIS — N52.1 TYPE 2 DIABETES MELLITUS WITH OTHER CIRCULATORY COMPLICATIONS: ICD-10-CM

## 2025-02-03 PROCEDURE — 99213 OFFICE O/P EST LOW 20 MIN: CPT | Mod: 2W

## 2025-02-03 NOTE — REASON FOR VISIT
[Follow-up Visit ___] : a follow-up visit  for [unfilled] [Home] : at home, [unfilled] , at the time of the educational consult. [Medical Office: ([facility])___] : at the medical office located in  [Participant] : the participant [Self] : self

## 2025-02-03 NOTE — END OF VISIT
Patient called office, he is request pre op pain medication. Patient states that Dr Isdiro Diaz had given him medication in the past and is now establishing with Dr Leonardo Gant. Dr Isidro Diaz prescribed -  diclofenac (VOLTAREN) 75 MG EC tablet; Take 1 tablet by mouth 2 times daily - on 4/28/2022. Patient was seen in office on 6/6/2022 -               Spoke to Dr Leonardo Gant - he states he had lengthy discussion with patient that he would be giving pre op pain medication. Patient voicemail was returned, no answer - left message stating that Dr Leonardo Gant not be providing any pre operative pain management , if he would like he can reach out to his PCP and see if they would refill his diclofenac (VOLTAREN) 75 MG EC tablet; Take 1 tablet by mouth 2 times daily - that Dr Isidro Diaz initially provided. Instructed on voicemail , any questions or concerns he can call the office. Otherwise he is scheduled for surgery on 7/7/2022 and post op appointment in 7/15/2022.     KEVIN [Time Spent: ___ minutes] : I have spent [unfilled] minutes of time on the encounter which excludes teaching and separately reported services. [FreeTextEntry3] : The complete time calculation ( 38  minutes) for this patient encounter, which excludes teaching and separately reported services, but includes a review of the patient's past medical records pertinent to his chief complaint, including medical, and surgical history, review of medications taken and of previous visits with other health care providers. In addition to the time spent with the patient, the total time calculation also includes the time necessary to document all of the information gathered during this session into the patient's electronic health records.

## 2025-02-03 NOTE — PLAN
[TextEntry] : A/P, 64M w/ ED and LUTS for 6 mo f/u - s/p Rezum on 5/15/24 with partial benefit, improved urinary flow but not frequency of urination - S/P Insertion of IPP on 10/25/2019 AMS LGX/MS Pump. - refills tadalafil and alfuzosin sent for 1 year - f/u 6 months

## 2025-02-03 NOTE — HISTORY OF PRESENT ILLNESS
[FreeTextEntry1] : 64M w/ ED and LUTS for 6 mo f/u s/p Rezum on 5/15/24 S/P Insertion of IPP on 10/25/2019 AMS LGX/MS Pump.  Pt reports that s/p Rezum his urinary stream has improved, but has not helped much with frequency Last PSA, July 2024, 1.0 pt needs refills tadalafil and alfuzosin

## (undated) DEVICE — DRAINAGE BAG URINARY 4L

## (undated) DEVICE — GLV 7.5 PROTEXIS (WHITE)

## (undated) DEVICE — PACK CYSTO

## (undated) DEVICE — TUBING TUR 2 PRONG